# Patient Record
Sex: FEMALE | Race: BLACK OR AFRICAN AMERICAN | Employment: FULL TIME | URBAN - METROPOLITAN AREA
[De-identification: names, ages, dates, MRNs, and addresses within clinical notes are randomized per-mention and may not be internally consistent; named-entity substitution may affect disease eponyms.]

---

## 2019-04-30 ENCOUNTER — OFFICE VISIT (OUTPATIENT)
Dept: NEUROLOGY | Age: 44
End: 2019-04-30

## 2019-04-30 VITALS
WEIGHT: 171 LBS | DIASTOLIC BLOOD PRESSURE: 75 MMHG | HEART RATE: 60 BPM | BODY MASS INDEX: 28.49 KG/M2 | HEIGHT: 65 IN | SYSTOLIC BLOOD PRESSURE: 122 MMHG | OXYGEN SATURATION: 98 %

## 2019-04-30 DIAGNOSIS — G44.309 POST-CONCUSSION HEADACHE: ICD-10-CM

## 2019-04-30 DIAGNOSIS — R93.0 ABNORMAL CT OF THE HEAD: Primary | ICD-10-CM

## 2019-04-30 DIAGNOSIS — G43.009 MIGRAINE WITHOUT AURA AND WITHOUT STATUS MIGRAINOSUS, NOT INTRACTABLE: ICD-10-CM

## 2019-04-30 RX ORDER — TOPIRAMATE 25 MG/1
TABLET ORAL
Qty: 120 TAB | Refills: 2 | Status: SHIPPED | OUTPATIENT
Start: 2019-04-30 | End: 2019-06-04

## 2019-04-30 RX ORDER — SUMATRIPTAN 25 MG/1
TABLET, FILM COATED ORAL
Qty: 9 TAB | Refills: 2 | Status: SHIPPED | OUTPATIENT
Start: 2019-04-30 | End: 2021-06-08 | Stop reason: ALTCHOICE

## 2019-04-30 NOTE — PATIENT INSTRUCTIONS

## 2019-04-30 NOTE — LETTER
Cecilia Farfan 38 y.o. female is my office patient. She I am seeing her for migraines. She can have episodic migraines while at work and might have to take a break and go to a quiet and dark place. In addition to that, sometimes she might have to leave early because of her migraines. Call with questions.  
 
 
Dana Shipley MD 
Neurologist
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
## 2019-04-30 NOTE — LETTER
Onofre Tena 38 y.o. female is my office patient and I am seeing her for migraines. I do recommend that if she does have an attack of migraine while at work, she takes a break and goes to a relatively dark and quiet place. Call with questions.  
 
 
Harini Xiao MD 
Neurologist

## 2019-05-08 ENCOUNTER — DOCUMENTATION ONLY (OUTPATIENT)
Dept: NEUROLOGY | Age: 44
End: 2019-05-08

## 2019-05-16 ENCOUNTER — HOSPITAL ENCOUNTER (OUTPATIENT)
Dept: MRI IMAGING | Age: 44
Discharge: HOME | End: 2019-05-16
Attending: PSYCHIATRY & NEUROLOGY
Payer: COMMERCIAL

## 2019-05-16 DIAGNOSIS — R93.0 ABNORMAL CT OF THE HEAD: ICD-10-CM

## 2019-05-16 PROCEDURE — 70551 MRI BRAIN STEM W/O DYE: CPT

## 2019-05-23 ENCOUNTER — TELEPHONE (OUTPATIENT)
Dept: NEUROLOGY | Age: 44
End: 2019-05-23

## 2019-06-04 DIAGNOSIS — G43.009 MIGRAINE WITHOUT AURA AND WITHOUT STATUS MIGRAINOSUS, NOT INTRACTABLE: ICD-10-CM

## 2019-06-04 RX ORDER — TOPIRAMATE 25 MG/1
TABLET ORAL
Qty: 240 TAB | Refills: 1 | Status: SHIPPED | OUTPATIENT
Start: 2019-06-04 | End: 2020-01-17 | Stop reason: ALTCHOICE

## 2019-07-01 ENCOUNTER — OFFICE VISIT (OUTPATIENT)
Dept: NEUROLOGY | Age: 44
End: 2019-07-01

## 2019-07-01 VITALS
SYSTOLIC BLOOD PRESSURE: 155 MMHG | BODY MASS INDEX: 28.82 KG/M2 | HEART RATE: 78 BPM | HEIGHT: 65 IN | OXYGEN SATURATION: 98 % | WEIGHT: 173 LBS | DIASTOLIC BLOOD PRESSURE: 75 MMHG

## 2019-07-01 DIAGNOSIS — G43.009 MIGRAINE WITHOUT AURA AND WITHOUT STATUS MIGRAINOSUS, NOT INTRACTABLE: Primary | ICD-10-CM

## 2019-07-01 DIAGNOSIS — G44.309 POST-CONCUSSION HEADACHE: ICD-10-CM

## 2019-07-01 RX ORDER — ZOLMITRIPTAN 5 MG/1
1 SPRAY NASAL
Qty: 1 CONTAINER | Refills: 0 | Status: SHIPPED | COMMUNITY
Start: 2019-07-01 | End: 2019-07-01

## 2019-07-01 RX ORDER — METHYLPREDNISOLONE 4 MG/1
TABLET ORAL
Qty: 1 DOSE PACK | Refills: 0 | Status: SHIPPED | OUTPATIENT
Start: 2019-07-01 | End: 2019-10-07 | Stop reason: ALTCHOICE

## 2019-07-01 NOTE — Clinical Note
7/1/19 Patient: Chelsey Lane YOB: 1975 Date of Visit: 7/1/2019 Jim Chaudhry MD 
Patient Can Only Remember The Practice Name And Not The Physician 
VIA Dear Hardik Chaudhry MD, Thank you for referring Ms. Rosemarie Marcum to [address] [place] [address] for evaluation. My notes for this consultation are attached. If you have questions, please do not hesitate to call me. I look forward to following your patient along with you. Sincerely, Asya Solano MD

## 2019-07-01 NOTE — PATIENT INSTRUCTIONS
Office Policies  · Phone calls/patient messages:  Please allow up to 24 hours for someone in the office to contact you about your call or message. Be mindful your provider may be out of the office or your message may require further review. We encourage you to use [employer] for your messages as this is a faster, more efficient way to communicate with our office  · Medication Refills:  Prescription medications require up to 48 business hours to process. We encourage you to use [employer] for your refills. For controlled medications: Please allow up to 72 business hours to process. Certain medications may require you to  a written prescription at our office. NO narcotic/controlled medications will be prescribed after 4pm Monday through Friday or on weekends  · Form/Paperwork Completion:  Please note there is a $25 fee for all paperwork completed by our providers. We ask that you allow 7-14 business days. Pre-payment is due prior to picking up/faxing the completed form. You may also download your forms to [employer] to have your doctor print off.

## 2019-08-13 ENCOUNTER — TELEPHONE (OUTPATIENT)
Dept: NEUROLOGY | Age: 44
End: 2019-08-13

## 2019-08-14 ENCOUNTER — TELEPHONE (OUTPATIENT)
Dept: NEUROLOGY | Age: 44
End: 2019-08-14

## 2019-08-29 ENCOUNTER — TELEPHONE (OUTPATIENT)
Dept: NEUROLOGY | Age: 44
End: 2019-08-29

## 2019-08-29 NOTE — TELEPHONE ENCOUNTER
Re: Emgality PA request  Sent to [facility], included 7/1/19,4/30/19 office notes.   Grant Long (Key: [id number])   Rx #: [id number]   Emgality 120MG/ML auto-injectors (migraine)   Form  [facility] Electronic PA Form (NCPDP)

## 2019-09-10 ENCOUNTER — TELEPHONE (OUTPATIENT)
Dept: NEUROLOGY | Age: 44
End: 2019-09-10

## 2019-09-16 ENCOUNTER — TELEPHONE (OUTPATIENT)
Dept: NEUROLOGY | Age: 44
End: 2019-09-16

## 2019-09-30 ENCOUNTER — TELEPHONE (OUTPATIENT)
Dept: NEUROLOGY | Age: 44
End: 2019-09-30

## 2019-09-30 NOTE — TELEPHONE ENCOUNTER
Spoke to patient back line is having dizziness getting out of the shower in the morning. Discussed with patient to take sumatriptan Rx for abortive therapy if does not help with her pain patient should go to ER to be evaluated and treated.       Dr Jackson Juárez patients pregnancy test was negative  can proceed with PA for [facility]

## 2019-10-01 DIAGNOSIS — G43.009 MIGRAINE WITHOUT AURA AND WITHOUT STATUS MIGRAINOSUS, NOT INTRACTABLE: Primary | ICD-10-CM

## 2019-10-01 RX ORDER — PROPRANOLOL HYDROCHLORIDE 80 MG/1
80 CAPSULE, EXTENDED RELEASE ORAL DAILY
Qty: 30 CAP | Refills: 0 | Status: SHIPPED | OUTPATIENT
Start: 2019-10-01 | End: 2019-12-27 | Stop reason: SDUPTHER

## 2019-10-01 NOTE — TELEPHONE ENCOUNTER
I am adding propranolol 80 mg daily for migraine prevention. According to [facility] denial, 2 medications need to be tried before they will approve it. Patient is already tried Topamax. But now adding propranolol and will give it a month and if there is no improvement in headaches, will prescribe Emgality.

## 2019-10-04 ENCOUNTER — TELEPHONE (OUTPATIENT)
Dept: NEUROLOGY | Age: 44
End: 2019-10-04

## 2019-10-07 ENCOUNTER — OFFICE VISIT (OUTPATIENT)
Dept: NEUROLOGY | Age: 44
End: 2019-10-07

## 2019-10-07 DIAGNOSIS — G43.009 MIGRAINE WITHOUT AURA AND WITHOUT STATUS MIGRAINOSUS, NOT INTRACTABLE: Primary | ICD-10-CM

## 2019-10-07 DIAGNOSIS — G44.309 POST-CONCUSSION HEADACHE: ICD-10-CM

## 2019-10-17 ENCOUNTER — TELEPHONE (OUTPATIENT)
Dept: NEUROLOGY | Age: 44
End: 2019-10-17

## 2019-11-07 ENCOUNTER — HOSPITAL ENCOUNTER (OUTPATIENT)
Dept: MRI IMAGING | Age: 44
Discharge: HOME | End: 2019-11-07
Attending: PSYCHIATRY & NEUROLOGY
Payer: COMMERCIAL

## 2019-11-07 DIAGNOSIS — G43.009 MIGRAINE WITHOUT AURA AND WITHOUT STATUS MIGRAINOSUS, NOT INTRACTABLE: ICD-10-CM

## 2019-11-07 PROCEDURE — 74011250636 HC RX REV CODE- 250/636: Performed by: PSYCHIATRY & NEUROLOGY

## 2019-11-07 PROCEDURE — 70553 MRI BRAIN STEM W/O & W/DYE: CPT

## 2019-11-07 PROCEDURE — A9575 INJ GADOTERATE MEGLUMI 0.1ML: HCPCS | Performed by: PSYCHIATRY & NEUROLOGY

## 2019-11-07 RX ORDER — GADOTERATE MEGLUMINE 376.9 MG/ML
14 INJECTION INTRAVENOUS
Status: COMPLETED | OUTPATIENT
Start: 2019-11-07 | End: 2019-11-07

## 2019-11-07 RX ADMIN — GADOTERATE MEGLUMINE 14 ML: 376.9 INJECTION INTRAVENOUS at 11:59

## 2019-12-27 DIAGNOSIS — G43.009 MIGRAINE WITHOUT AURA AND WITHOUT STATUS MIGRAINOSUS, NOT INTRACTABLE: ICD-10-CM

## 2019-12-27 NOTE — TELEPHONE ENCOUNTER
Received refill request from [facility] for Propranolol. Last filled 10/7/19. Last visit 10/7/19 with Dr John Nava . Next appt scheduled for 01/20/20. Please advise.

## 2019-12-30 RX ORDER — PROPRANOLOL HYDROCHLORIDE 80 MG/1
80 CAPSULE, EXTENDED RELEASE ORAL DAILY
Qty: 30 CAP | Refills: 0 | Status: SHIPPED | OUTPATIENT
Start: 2019-12-30 | End: 2020-01-17 | Stop reason: SDUPTHER

## 2020-01-13 ENCOUNTER — TELEPHONE (OUTPATIENT)
Dept: NEUROLOGY | Age: 45
End: 2020-01-13

## 2020-01-17 DIAGNOSIS — G43.009 MIGRAINE WITHOUT AURA AND WITHOUT STATUS MIGRAINOSUS, NOT INTRACTABLE: ICD-10-CM

## 2020-01-17 DIAGNOSIS — R90.89 ABNORMAL FINDING ON MRI OF BRAIN: Primary | ICD-10-CM

## 2020-01-17 RX ORDER — PROPRANOLOL HYDROCHLORIDE 80 MG/1
80 CAPSULE, EXTENDED RELEASE ORAL DAILY
Qty: 30 CAP | Refills: 0 | Status: SHIPPED | OUTPATIENT
Start: 2020-01-17 | End: 2020-02-12 | Stop reason: SDUPTHER

## 2020-02-03 ENCOUNTER — OFFICE VISIT (OUTPATIENT)
Dept: NEUROLOGY | Age: 45
End: 2020-02-03

## 2020-02-03 VITALS
BODY MASS INDEX: 28.82 KG/M2 | SYSTOLIC BLOOD PRESSURE: 140 MMHG | HEIGHT: 65 IN | HEART RATE: 65 BPM | DIASTOLIC BLOOD PRESSURE: 70 MMHG | WEIGHT: 173 LBS | OXYGEN SATURATION: 98 %

## 2020-02-03 DIAGNOSIS — R06.83 SNORING: ICD-10-CM

## 2020-02-03 DIAGNOSIS — G43.009 MIGRAINE WITHOUT AURA AND WITHOUT STATUS MIGRAINOSUS, NOT INTRACTABLE: Primary | ICD-10-CM

## 2020-02-03 DIAGNOSIS — G44.309 POST-CONCUSSION HEADACHE: ICD-10-CM

## 2020-02-03 NOTE — PATIENT INSTRUCTIONS
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
## 2020-02-12 DIAGNOSIS — G43.009 MIGRAINE WITHOUT AURA AND WITHOUT STATUS MIGRAINOSUS, NOT INTRACTABLE: ICD-10-CM

## 2020-02-12 NOTE — TELEPHONE ENCOUNTER
Future Appointments   Date Time Provider Sangeeta Denise   6/4/2020  2:00 PM MD Emily Gordon                         Last Appointment My Department:  2/3/2020    Please advise of refill below. Requested Prescriptions     Pending Prescriptions Disp Refills    propranolol LA (INDERAL LA) 80 mg SR capsule 30 Cap 0     Sig: Take 1 Cap by mouth daily.

## 2020-02-18 RX ORDER — PROPRANOLOL HYDROCHLORIDE 80 MG/1
80 CAPSULE, EXTENDED RELEASE ORAL DAILY
Qty: 30 CAP | Refills: 11 | Status: SHIPPED | OUTPATIENT
Start: 2020-02-18 | End: 2021-09-14

## 2020-03-19 ENCOUNTER — TELEPHONE (OUTPATIENT)
Dept: NEUROLOGY | Age: 45
End: 2020-03-19

## 2020-03-19 NOTE — TELEPHONE ENCOUNTER
Emgality PA request sent via [facility] to [facility]. Aron Ornelas (Lo: [id number])  Rx #: [id number]  Emgality 120MG/ML auto-injectors (migraine)     Form  [facility] Electronic PA Form (NCPDP)     Status is pending.

## 2020-04-14 ENCOUNTER — TELEPHONE (OUTPATIENT)
Dept: NEUROLOGY | Age: 45
End: 2020-04-14

## 2020-05-28 ENCOUNTER — DOCUMENTATION ONLY (OUTPATIENT)
Dept: SLEEP MEDICINE | Age: 45
End: 2020-05-28
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
## 2021-06-08 ENCOUNTER — VIRTUAL VISIT (OUTPATIENT)
Dept: NEUROLOGY | Age: 46
End: 2021-06-08
Payer: COMMERCIAL

## 2021-06-08 DIAGNOSIS — G43.009 MIGRAINE WITHOUT AURA AND WITHOUT STATUS MIGRAINOSUS, NOT INTRACTABLE: Primary | ICD-10-CM

## 2021-06-08 DIAGNOSIS — R90.89 ABNORMAL FINDING ON MRI OF BRAIN: ICD-10-CM

## 2021-06-08 DIAGNOSIS — G44.309 POST-CONCUSSION HEADACHE: ICD-10-CM

## 2021-06-08 PROCEDURE — 99214 OFFICE O/P EST MOD 30 MIN: CPT | Performed by: PSYCHIATRY & NEUROLOGY

## 2021-06-08 RX ORDER — GALCANEZUMAB 120 MG/ML
240 INJECTION, SOLUTION SUBCUTANEOUS ONCE
Qty: 2 ML | Refills: 0 | Status: SHIPPED | OUTPATIENT
Start: 2021-06-08 | End: 2021-06-08

## 2021-06-08 RX ORDER — GALCANEZUMAB 120 MG/ML
120 INJECTION, SOLUTION SUBCUTANEOUS
Qty: 1 ML | Refills: 11 | Status: SHIPPED | OUTPATIENT
Start: 2021-06-08 | End: 2021-09-14

## 2021-06-08 NOTE — PROGRESS NOTES
Chief Complaint   Patient presents with    Follow-up     doing ok, still has her days     Migraine     [employer] was never approved, needed PA but it was never received
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
## 2021-09-14 ENCOUNTER — VIRTUAL VISIT (OUTPATIENT)
Dept: NEUROLOGY | Age: 46
End: 2021-09-14
Payer: COMMERCIAL

## 2021-09-14 DIAGNOSIS — G43.009 MIGRAINE WITHOUT AURA AND WITHOUT STATUS MIGRAINOSUS, NOT INTRACTABLE: Primary | ICD-10-CM

## 2021-09-14 DIAGNOSIS — R90.89 ABNORMAL FINDING ON MRI OF BRAIN: ICD-10-CM

## 2021-09-14 DIAGNOSIS — G44.309 POST-CONCUSSION HEADACHE: ICD-10-CM

## 2021-09-14 PROBLEM — F45.22 BODY DYSMORPHIC DISORDER: Status: ACTIVE | Noted: 2021-09-14

## 2021-09-14 PROBLEM — L02.91 ABSCESS OF SKIN AND SUBCUTANEOUS TISSUE: Status: ACTIVE | Noted: 2021-09-14

## 2021-09-14 PROBLEM — F32.2 SEVERE MAJOR DEPRESSION, SINGLE EPISODE, WITHOUT PSYCHOTIC FEATURES (HCC): Status: ACTIVE | Noted: 2021-09-14

## 2021-09-14 PROBLEM — D50.9 IRON DEFICIENCY ANEMIA: Status: ACTIVE | Noted: 2021-09-14

## 2021-09-14 PROBLEM — E55.9 VITAMIN D DEFICIENCY: Status: ACTIVE | Noted: 2021-09-14

## 2021-09-14 PROBLEM — R42 DIZZINESS: Status: ACTIVE | Noted: 2021-09-14

## 2021-09-14 PROBLEM — F41.1 GENERALIZED ANXIETY DISORDER: Status: ACTIVE | Noted: 2021-09-14

## 2021-09-14 PROBLEM — Z12.39 ENCOUNTER FOR SCREENING FOR MALIGNANT NEOPLASM OF BREAST: Status: ACTIVE | Noted: 2021-09-14

## 2021-09-14 PROBLEM — J06.9 VIRAL UPPER RESPIRATORY TRACT INFECTION: Status: ACTIVE | Noted: 2021-09-14

## 2021-09-14 PROCEDURE — 99215 OFFICE O/P EST HI 40 MIN: CPT | Performed by: PSYCHIATRY & NEUROLOGY

## 2021-09-14 RX ORDER — IBUPROFEN 800 MG/1
800 TABLET ORAL
COMMUNITY
Start: 2021-09-08 | End: 2022-03-23

## 2021-09-14 RX ORDER — BUPROPION HYDROCHLORIDE 150 MG/1
150 TABLET ORAL DAILY
COMMUNITY
Start: 2021-06-07 | End: 2021-09-14

## 2021-09-14 RX ORDER — PROPRANOLOL HYDROCHLORIDE 10 MG/1
TABLET ORAL
COMMUNITY
Start: 2021-06-07 | End: 2021-09-14

## 2021-09-14 RX ORDER — BUPROPION HYDROCHLORIDE 100 MG/1
TABLET, EXTENDED RELEASE ORAL
COMMUNITY
End: 2021-09-14

## 2021-09-14 RX ORDER — FLUOXETINE HYDROCHLORIDE 20 MG/1
CAPSULE ORAL
COMMUNITY
End: 2021-09-14

## 2021-09-14 RX ORDER — AMOXICILLIN 500 MG/1
CAPSULE ORAL
COMMUNITY
Start: 2021-09-08 | End: 2022-03-23

## 2021-09-14 RX ORDER — LORAZEPAM 0.5 MG/1
TABLET ORAL
COMMUNITY
End: 2021-09-14

## 2021-09-14 RX ORDER — SODIUM FLUORIDE 1.1 G/100G
GEL, DENTIFRICE ORAL
COMMUNITY
Start: 2021-08-10 | End: 2021-09-14

## 2021-09-14 NOTE — ASSESSMENT & PLAN NOTE
Patient has an active order for repeat MRI scan of the brain  I do think it would probably be important for her to get this completed but for now she would prefer to defer
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
## 2021-09-14 NOTE — PATIENT INSTRUCTIONS
As per discussion    I agree you are doing well  Right now I do not think you need to be on any preventative medication  As long as you can take 1 or 2 doses of Tylenol no more than 2 days/week to manage her headaches and migraines I think that is all you need to do  If however you are starting to escalate more consistently in terms of the number of days per week you are getting headaches more than normal doses you need to totally abort the headache then you probably need to get in to see me sooner so we can make some adjustments    Regarding mood and depression  Continue with your counselor  Your counselor will advise you if you need to go back on medication and see mental health psychiatry for med management  Allow some information from the [facility] about seasonal affective disorder and choosing a light therapy which might be beneficial    [email or web address]       If you need me before your next office appointment my chart is most efficient effective way to communicate with me but can always call the office    Please get your booster shot for Covid when available you can check with your pharmacist, and thank you for getting your initial Covid vaccination completed    Finally MRI scan was ordered for you back in June I do think it would be important to relook at your MRI scan to make sure there is been no change or progression. The order is still active  You can call central scheduling at [phone] to get that set up. If there there are any changes we will notify you by CADsurft and discuss any further intervention at that time          Office Policies    o Phone calls/patient messages:  Please allow up to 24 hours for someone in the office to contact you about your call or message. Be mindful your provider may be out of the office or your message may require further review.  We encourage you to use [employer] for your messages as this is a faster, more efficient way to communicate with our office    o Medication Refills:  Prescription medications require up to 48 business hours to process. We encourage you to use [employer] for your refills. For controlled medications: Please allow up to 72 business hours to process. Certain medications may require you to  a written prescription at our office. NO narcotic/controlled medications will be prescribed after 4pm Monday through Friday or on weekends    o Form/Paperwork Completion:  We ask that you allow 7-14 business days. You may also download your forms to [employer] to have your doctor print off.

## 2021-09-14 NOTE — ASSESSMENT & PLAN NOTE
Seemingly improved and stable without preventative medication  Using over-the-counter abortive medication only  As long she is not using rescue medication more than 2 days a week and more than 2 doses per day to fully abort the headache we do not need any further intervention at this time

## 2021-10-14 PROBLEM — Z12.39 ENCOUNTER FOR SCREENING FOR MALIGNANT NEOPLASM OF BREAST: Status: RESOLVED | Noted: 2021-09-14 | Resolved: 2021-10-14

## 2022-03-18 PROBLEM — F45.22 BODY DYSMORPHIC DISORDER: Status: ACTIVE | Noted: 2021-09-14

## 2022-03-18 PROBLEM — G43.009 MIGRAINE WITHOUT AURA AND WITHOUT STATUS MIGRAINOSUS, NOT INTRACTABLE: Status: ACTIVE | Noted: 2021-09-14

## 2022-03-18 PROBLEM — R42 DIZZINESS: Status: ACTIVE | Noted: 2021-09-14

## 2022-03-18 PROBLEM — F32.2 SEVERE MAJOR DEPRESSION, SINGLE EPISODE, WITHOUT PSYCHOTIC FEATURES (HCC): Status: ACTIVE | Noted: 2021-09-14

## 2022-03-19 PROBLEM — F41.1 GENERALIZED ANXIETY DISORDER: Status: ACTIVE | Noted: 2021-09-14

## 2022-03-19 PROBLEM — D50.9 IRON DEFICIENCY ANEMIA: Status: ACTIVE | Noted: 2021-09-14

## 2022-03-19 PROBLEM — R90.89 ABNORMAL FINDING ON MRI OF BRAIN: Status: ACTIVE | Noted: 2021-09-14

## 2022-03-19 PROBLEM — J06.9 VIRAL UPPER RESPIRATORY TRACT INFECTION: Status: ACTIVE | Noted: 2021-09-14

## 2022-03-19 PROBLEM — E55.9 VITAMIN D DEFICIENCY: Status: ACTIVE | Noted: 2021-09-14

## 2022-03-20 PROBLEM — G44.309 POST-CONCUSSION HEADACHE: Status: ACTIVE | Noted: 2021-09-14

## 2022-03-20 PROBLEM — L02.91 ABSCESS OF SKIN AND SUBCUTANEOUS TISSUE: Status: ACTIVE | Noted: 2021-09-14

## 2022-03-23 ENCOUNTER — VIRTUAL VISIT (OUTPATIENT)
Dept: NEUROLOGY | Age: 47
End: 2022-03-23
Payer: COMMERCIAL

## 2022-03-23 DIAGNOSIS — R93.0 ABNORMAL MRI OF HEAD: Primary | ICD-10-CM

## 2022-03-23 DIAGNOSIS — G43.709 CHRONIC MIGRAINE WITHOUT AURA WITHOUT STATUS MIGRAINOSUS, NOT INTRACTABLE: ICD-10-CM

## 2022-03-23 DIAGNOSIS — Z86.73 HISTORY OF STROKE: ICD-10-CM

## 2022-03-23 PROBLEM — K21.9 GASTROESOPHAGEAL REFLUX DISEASE: Status: ACTIVE | Noted: 2022-03-23

## 2022-03-23 PROCEDURE — 99215 OFFICE O/P EST HI 40 MIN: CPT | Performed by: PSYCHIATRY & NEUROLOGY

## 2022-03-23 RX ORDER — TOPIRAMATE 50 MG/1
50 TABLET, FILM COATED ORAL DAILY
Qty: 90 TABLET | Refills: 1 | Status: SHIPPED | OUTPATIENT
Start: 2022-03-23

## 2022-03-23 RX ORDER — LANOLIN ALCOHOL/MO/W.PET/CERES
CREAM (GRAM) TOPICAL
COMMUNITY
End: 2022-09-28

## 2022-03-23 RX ORDER — RIZATRIPTAN BENZOATE 10 MG/1
TABLET ORAL
Qty: 9 TABLET | Refills: 5 | Status: SHIPPED | OUTPATIENT
Start: 2022-03-23

## 2022-03-23 NOTE — PATIENT INSTRUCTIONS
As per discussion    I sent in a prescription for Topamax to help down regulate the migraine generator I want you to take 1 a day most people take it in the evening but it does not really matter as long as you are consistent with when to take it    You need more aggressive rescue medication some to start you on Maxalt also known as rizatriptan directions are on the bottle do not exceed more than 3 tablets in 24-hour. As we talked about you can use the Tylenol as well but try to figure out the severity and whether it would respond better to the Maxalt or the Tylenol stratify your utilization of your rescue medications based on the severity of the headache    Let me know how you are doing and next via [facility]    MRI has been ordered  Central scheduling should be calling you to set up the test that have been ordered. If you do not hear from them you can reach out to them at [phone] to get that completed. I reviewed the results of the MRI scan with my chart and let you know if we need to do anything differently based on those results        Office Policies    o Phone calls/patient messages:  Please allow up to 24 hours for someone in the office to contact you about your call or message. Be mindful your provider may be out of the office or your message may require further review. We encourage you to use [employer] for your messages as this is a faster, more efficient way to communicate with our office    o Medication Refills:  Prescription medications require up to 48 business hours to process. We encourage you to use [employer] for your refills. For controlled medications: Please allow up to 72 business hours to process. Certain medications may require you to  a written prescription at our office. NO narcotic/controlled medications will be prescribed after 4pm Monday through Friday or on weekends    o Form/Paperwork Completion:  We ask that you allow 7-14 business days.  You may also download your forms to MyChart to have your doctor print off.
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
## 2022-03-23 NOTE — ASSESSMENT & PLAN NOTE
Patient with a history of abnormal MRI scan of the brain  Secondary to COVID 19 this was never completed patient is willing to get MRI scan completed presently  Orders have been written for the new scan  Evaluating for progression of abnormalities   Evaluating for possible demyelinating disease  Secondarily patient is also having a change in headache and migraine and although this is not the primary reason why I am redoing the MRI scan we will certainly be able to see if there is anything significant that is triggering this

## 2022-03-23 NOTE — ASSESSMENT & PLAN NOTE
Patient with change in migraine she is having the morphine with more intensity    We will start Topamax 50 mg daily as preventative to help down regulate the migraine generator    She also needs better rescue protocol and I have prescribed Maxalt  She can continue to use the Tylenol  I recommended stratified protocol related to severity of migraine

## 2022-03-24 PROBLEM — G43.709 CHRONIC MIGRAINE WITHOUT AURA WITHOUT STATUS MIGRAINOSUS, NOT INTRACTABLE: Status: ACTIVE | Noted: 2021-09-14

## 2022-03-24 PROBLEM — K21.9 GASTROESOPHAGEAL REFLUX DISEASE: Status: ACTIVE | Noted: 2022-03-23

## 2022-03-24 PROBLEM — R93.0 ABNORMAL MRI OF HEAD: Status: ACTIVE | Noted: 2021-09-14

## 2022-04-17 ENCOUNTER — HOSPITAL ENCOUNTER (OUTPATIENT)
Dept: MRI IMAGING | Age: 47
Discharge: HOME | End: 2022-04-17
Payer: COMMERCIAL

## 2022-04-17 DIAGNOSIS — R93.0 ABNORMAL MRI OF HEAD: ICD-10-CM

## 2022-04-17 PROCEDURE — 70553 MRI BRAIN STEM W/O & W/DYE: CPT

## 2022-04-17 PROCEDURE — 74011250636 HC RX REV CODE- 250/636

## 2022-04-17 PROCEDURE — A9576 INJ PROHANCE MULTIPACK: HCPCS

## 2022-04-17 RX ADMIN — GADOTERIDOL 15 ML: 279.3 INJECTION, SOLUTION INTRAVENOUS at 15:00

## 2022-04-20 NOTE — PROGRESS NOTES
Results of the tests were reviewed in MyChart with the patient as follows:Good news! Your MRI of your brain has not changed since your prior scan in 2019 so that is awesome. But it does appear as though on both scans you have evidence of 2 old strokes.   If you are not already taking a baby aspirin a day I recommend taking 81 mg aspirin daily  In addition I like to order additional work-up regarding the findings on your MRI scan

## 2022-05-03 ENCOUNTER — TELEPHONE (OUTPATIENT)
Dept: NEUROLOGY | Age: 47
End: 2022-05-03

## 2022-05-03 NOTE — TELEPHONE ENCOUNTER
Pt would like a call back to go over MRI results. Pt is not sure if she can get in to [employer] to read message calvin irvin.  Please call [phone]

## 2022-05-04 NOTE — TELEPHONE ENCOUNTER
Confirmed patient id and advised of the results of the MRI and of the additional orders that Audra Ochoa has ordered. cta head neck scan and ECHO adult. Gave number to call to schedule the test.      Patient states that she is having new symptoms. She says she knows what she wants to say but it comes out different. Sometimes speech is slurred. no nose bleeding/no gum bleeding

## 2022-05-05 NOTE — TELEPHONE ENCOUNTER
Let the patient know that is why we are doing the additional testing.   Make sure she is on a baby aspirin a day and if she has any prolonged event she needs to go to the emergency room for evaluation

## 2022-05-05 NOTE — TELEPHONE ENCOUNTER
Confirmed patient id and advised to take baby aspirin and do the testing. She has scheduled for Friday the testing.   Advised tthat if she has any prolonged events to please go to the ER

## 2022-05-13 ENCOUNTER — HOSPITAL ENCOUNTER (OUTPATIENT)
Dept: NON INVASIVE DIAGNOSTICS | Age: 47
Discharge: HOME | End: 2022-05-13
Payer: COMMERCIAL

## 2022-05-13 ENCOUNTER — HOSPITAL ENCOUNTER (OUTPATIENT)
Dept: CT IMAGING | Age: 47
Discharge: HOME | End: 2022-05-13
Payer: COMMERCIAL

## 2022-05-13 DIAGNOSIS — Z86.73 HISTORY OF STROKE: ICD-10-CM

## 2022-05-13 DIAGNOSIS — R93.0 ABNORMAL MRI OF HEAD: ICD-10-CM

## 2022-05-13 PROCEDURE — 70498 CT ANGIOGRAPHY NECK: CPT

## 2022-05-13 PROCEDURE — 74011000636 HC RX REV CODE- 636: Performed by: RADIOLOGY

## 2022-05-13 PROCEDURE — 93306 TTE W/DOPPLER COMPLETE: CPT

## 2022-05-13 RX ADMIN — IOPAMIDOL 100 ML: 755 INJECTION, SOLUTION INTRAVENOUS at 14:24

## 2022-05-14 LAB
ECHO AV AREA PEAK VELOCITY: 2.1 CM2
ECHO AV PEAK GRADIENT: 7 MMHG
ECHO AV PEAK VELOCITY: 1.3 M/S
ECHO AV VELOCITY RATIO: 0.85
ECHO EST RA PRESSURE: 3 MMHG
ECHO LV FRACTIONAL SHORTENING: 36 % (ref 28–44)
ECHO LV INTERNAL DIMENSION DIASTOLIC MMODE: 5.3 CM (ref 3.9–5.3)
ECHO LV INTERNAL DIMENSION DIASTOLIC: 4.2 CM (ref 3.9–5.3)
ECHO LV INTERNAL DIMENSION SYSTOLIC MMODE: 3.5 CM
ECHO LV INTERNAL DIMENSION SYSTOLIC: 2.7 CM
ECHO LV IVSD MMODE: 0.7 CM (ref 0.6–0.9)
ECHO LV IVSS MMODE: 1.3 CM
ECHO LV POSTERIOR WALL DIASTOLIC MMODE: 1 CM (ref 0.6–0.9)
ECHO LV POSTERIOR WALL SYSTOLIC MMODE: 1.3 CM
ECHO LVOT AREA: 2.5 CM2
ECHO LVOT DIAM: 1.8 CM
ECHO LVOT PEAK GRADIENT: 4 MMHG
ECHO LVOT PEAK VELOCITY: 1.1 M/S
ECHO MV A VELOCITY: 0.92 M/S
ECHO RIGHT VENTRICULAR SYSTOLIC PRESSURE (RVSP): 18 MMHG
ECHO RV TAPSE: 3.1 CM (ref 1.7–?)
ECHO TV REGURGITANT MAX VELOCITY: 1.93 M/S
ECHO TV REGURGITANT PEAK GRADIENT: 15 MMHG

## 2022-05-14 PROCEDURE — 93306 TTE W/DOPPLER COMPLETE: CPT | Performed by: SPECIALIST

## 2022-05-16 NOTE — PROGRESS NOTES
Results of the tests were reviewed in MyChart with the patient as follows: The results of echocardiogram and your CTA of your head neck blood vessels are all normal.  So I have not been able to identify a cause for the 2 strokes that were noted on your MRI scan. However it is picked up an incidental 9 mm nodule on your thyroid. If this has not already been looked at, please contact your PCP for follow-up.   These are often benign and incidental but I would recommend contacting her PCP for further instruction on follow-up if needed    For now continue on an 81 mg enteric-coated aspirin daily

## 2022-05-24 ENCOUNTER — TELEPHONE (OUTPATIENT)
Dept: NEUROLOGY | Age: 47
End: 2022-05-24

## 2022-05-25 NOTE — TELEPHONE ENCOUNTER
Confirmed patient id and advised the patient of the message that was sent to her on 5/16 thru my chart. Read the message and advised patient to contact pcp as a 9mm nodule was found on the thyroid.   Advised to continue with the low dose aspirin
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
## 2022-06-29 ENCOUNTER — VIRTUAL VISIT (OUTPATIENT)
Dept: NEUROLOGY | Age: 47
End: 2022-06-29
Payer: COMMERCIAL

## 2022-06-29 DIAGNOSIS — G43.709 CHRONIC MIGRAINE WITHOUT AURA WITHOUT STATUS MIGRAINOSUS, NOT INTRACTABLE: ICD-10-CM

## 2022-06-29 DIAGNOSIS — I67.9 CEREBRAL VASCULAR DISEASE: ICD-10-CM

## 2022-06-29 DIAGNOSIS — G31.84 MCI (MILD COGNITIVE IMPAIRMENT): ICD-10-CM

## 2022-06-29 DIAGNOSIS — R41.3 MEMORY LOSS: Primary | ICD-10-CM

## 2022-06-29 DIAGNOSIS — R93.0 ABNORMAL MRI OF HEAD: ICD-10-CM

## 2022-06-29 DIAGNOSIS — G44.309 POST-CONCUSSION HEADACHE: ICD-10-CM

## 2022-06-29 PROCEDURE — 99443 PR PHYS/QHP TELEPHONE EVALUATION 21-30 MIN: CPT | Performed by: PSYCHIATRY & NEUROLOGY

## 2022-08-15 ENCOUNTER — TELEPHONE (OUTPATIENT)
Dept: NEUROLOGY | Age: 47
End: 2022-08-15

## 2022-08-15 NOTE — TELEPHONE ENCOUNTER
Pt has been at [facility] since Sat. They think she may have had another stroke. They are running more tests. She did have a speech pathologist come by who thinks she needs SP cognitive testing.  Please call to discuss what has been going on. [phone]

## 2022-08-16 ENCOUNTER — TELEPHONE (OUTPATIENT)
Dept: NEUROLOGY | Age: 47
End: 2022-08-16

## 2022-08-16 NOTE — TELEPHONE ENCOUNTER
Called and spoke with patient. Verified name/. Called to see how she was feeling since D/C. 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 When patient had TIA she noted she was typing on her computer and her left arm became feeling heavy/lazy feeling. She went to [facility] and diagnosed with TIA. I advised patient to get [facility] to fax over hospital records for Dr. Stas White to review. I also have changed patient virtual visit with Esdras Kevin on 2022 to in-person for a hospital follow up. Patient is wondering if she went back to working too fast and what to do. Sent to Dr. Stas White to advise.

## 2022-08-16 NOTE — TELEPHONE ENCOUNTER
Patient returned call to Nurse. Stated she did have a mini stroke, was released from the hospital and is going to attempt to go to work today.  She stated that if she doesn't answer her phone to please leave her a detailed vm. [phone]

## 2022-08-17 ENCOUNTER — TELEPHONE (OUTPATIENT)
Dept: NEUROLOGY | Age: 47
End: 2022-08-17

## 2022-08-22 ENCOUNTER — TELEPHONE (OUTPATIENT)
Dept: NEUROLOGY | Age: 47
End: 2022-08-22

## 2022-08-22 NOTE — TELEPHONE ENCOUNTER
VOICEMAIL    . Pt states that she came in thurs 8/18 to fill out a release of med info form. States that location to release info to us has not received the form. Is asking for us to refax. Please call pt to confim what fax number it was sent to and if we have received confirmation that it went through.  [phone]

## 2022-08-23 NOTE — TELEPHONE ENCOUNTER
Pt called after checking with [facility]. They still have not received the release form. Patient has asked for us to fax to: [phone] Please write Urgent or STAT on the cover sheet.

## 2022-09-07 NOTE — TELEPHONE ENCOUNTER
Pt called to check to see if we have received the records from [facility]. Confirmed that we did receive records. Would like to speak to a nurse about what was found and some of the symptoms that have continued since her stroke. Pt has an appt on 9/28 with Fiona Fleming. Pt wants to know if she can be seen sooner.  Please call [phone]

## 2022-09-08 ENCOUNTER — TELEPHONE (OUTPATIENT)
Dept: NEUROLOGY | Age: 47
End: 2022-09-08

## 2022-09-08 NOTE — TELEPHONE ENCOUNTER
Called and spoke with patient. Verified name/. Patient stated she is having increasing brain fog, difficulty concentrating, issues forming words, and has been more emotional. She also would like to know if Dr Kia Fenton could fill out paperwork for accomodation for her job. Routed to Dr. Kia Fenton to review. Patient also with questions regarding her records from [facility] on what Dr. Kia Fenton saw with them.

## 2022-09-12 NOTE — TELEPHONE ENCOUNTER
Called and spoke with patient. Patient received a work letter from her PCP, she is hoping this will suffice. Patient wants to know regarding what you found on her records you requested from [facility]. Also, she wants to know what do regarding the symptoms she is experiencing. Patient was explaining to me about her work accomodation papers, the phone got disconnected. Called back, she stated she is just worried about being out of a job. Stated if they need a specialist to review for her to drop the paperwork off and he can review and we go from there. {Patient verbalized understanding.

## 2022-09-14 ENCOUNTER — TELEPHONE (OUTPATIENT)
Dept: NEUROLOGY | Age: 47
End: 2022-09-14

## 2022-09-14 NOTE — TELEPHONE ENCOUNTER
Voicemail:    Patient returned call to Nurse to let her know that the accommodating paperwork does need to be be filled out and signed by a specialty provider. Patient stated that it is due on Friday and would like to know if she can be squeezed in to be seen to get the paperwork done. Please advise.  [phone]

## 2022-09-14 NOTE — TELEPHONE ENCOUNTER
Called and spoke with patient. Notified patient that her primary neurologist will need to fill this paperwork out as Dr. Chris Townsend only saw her for that emergency work in appointment. Patient stated her paperwork is due 9/16/2022. Notified patient that [facility] does not have an appointment to be able to get her in to fill that paperwork out. I asked patient to contact her work to see If they can extend the due date for her accomodation paperwork. She will do this and await a call back from [address] [place] [address]

## 2022-09-15 NOTE — TELEPHONE ENCOUNTER
Confirmed patient id and advised that we do have the notes from javon and also Brooklynn Newell wants the patient to please keep appointment 9/28 and to get extension as she is not able to complete without the visit. Since patient last visit with Brooklynn Newell only pertained to migraine.

## 2022-09-19 ENCOUNTER — TELEPHONE (OUTPATIENT)
Dept: NEUROLOGY | Age: 47
End: 2022-09-19

## 2022-09-19 NOTE — TELEPHONE ENCOUNTER
VOICEMAIL    Pt was able to get an extension of her medical accomodation forms. Paperwork needs to be turned in by end of month.  Please call [phone]

## 2022-09-19 NOTE — TELEPHONE ENCOUNTER
LVM for patient that we are glad that she got the extention and to please bring the paperwork with her at her visit on 9/28 and the forms will be completed at the visit.

## 2022-09-28 ENCOUNTER — OFFICE VISIT (OUTPATIENT)
Dept: NEUROLOGY | Age: 47
End: 2022-09-28
Payer: COMMERCIAL

## 2022-09-28 VITALS
TEMPERATURE: 97.6 F | HEART RATE: 69 BPM | RESPIRATION RATE: 16 BRPM | SYSTOLIC BLOOD PRESSURE: 120 MMHG | DIASTOLIC BLOOD PRESSURE: 62 MMHG | BODY MASS INDEX: 27.89 KG/M2 | OXYGEN SATURATION: 100 % | HEIGHT: 65 IN | WEIGHT: 167.4 LBS

## 2022-09-28 DIAGNOSIS — Z86.73 HISTORY OF STROKE: ICD-10-CM

## 2022-09-28 DIAGNOSIS — G45.1 TIA INVOLVING CAROTID ARTERY: ICD-10-CM

## 2022-09-28 DIAGNOSIS — R41.9 COGNITIVE COMPLAINTS: ICD-10-CM

## 2022-09-28 DIAGNOSIS — G43.709 CHRONIC MIGRAINE WITHOUT AURA WITHOUT STATUS MIGRAINOSUS, NOT INTRACTABLE: ICD-10-CM

## 2022-09-28 PROCEDURE — 99215 OFFICE O/P EST HI 40 MIN: CPT | Performed by: PSYCHIATRY & NEUROLOGY

## 2022-09-28 RX ORDER — ASPIRIN 81 MG/1
TABLET ORAL
COMMUNITY

## 2022-09-28 RX ORDER — ATORVASTATIN CALCIUM 20 MG/1
TABLET, FILM COATED ORAL
COMMUNITY
Start: 2022-08-16

## 2022-09-28 NOTE — ASSESSMENT & PLAN NOTE
Stable without recurrence of symptoms  Patient is to continue on 81 mg aspirin a day    Patient is to continue to work to all of his comorbid conditions as managed by PCP and other specialists as appropriate    RECOMMENDATIONS:  - BP goal is less than 140/90  - Goal HbA1c is less than 7.   - LDL less than 70     Paperwork for employer related to [facility] and loss time at work was completed at today's office visit as well
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
## 2022-09-28 NOTE — PROGRESS NOTES
Chief Complaint   Patient presents with    Hospital Follow Up     On TIA and has paperwork for work to be done
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
## 2022-09-28 NOTE — PATIENT INSTRUCTIONS
As per discussion    For now continue on all your medications unchanged    We filled out the paperwork specific to your TIA that you were seen in [facility] on 8/13/2022  Paperwork was filled out for 8/13/2022 through 8/30/2022    Testing for your cognitive concerns have been ordered with Dr. Meg Galvan and that is scheduled for February 3, 8001      Office Policies      Appointments  Please make sure that you arrive for your next appointment at least 15 minutes prior to your appointment time. If for some reason you are going to be late please notify the office to determine if you need to be rescheduled or we can adjust your appointment time      Phone calls/patient messages:  Please allow up to 24 hours for someone in the office to contact you about your call or message. Be mindful your provider may be out of the office or your message may require further review. We encourage you to use [employer] for your messages as this is a faster, more efficient way to communicate with our office    Medication Refills:  Prescription medications require up to 48 business hours to process. We encourage you to use [employer] for your refills. For controlled medications: Please allow up to 72 business hours to process. Certain medications may require you to  a written prescription at our office. NO narcotic/controlled medications will be prescribed after 4pm Monday through Friday or on weekends    Form/Paperwork Completion:  We ask that you allow 7-14 business days. You may also download your forms to [employer] to have your doctor print off.
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
## 2022-10-26 ENCOUNTER — TELEPHONE (OUTPATIENT)
Dept: NEUROLOGY | Age: 47
End: 2022-10-26

## 2022-10-26 NOTE — TELEPHONE ENCOUNTER
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 She ended her call and laid down. When she stood up she was dizzy and lite headed. She noted she has just felt off today and not right. When she went up the stairs she noted sob, some confusion. No headache, no weakness on one side of body verus other. No vitals to report. She notes some similar symptoms to her other TIA. Pt has only had almonds to eat today nothing to drink. I advised this could just be dehydration due to nothing to drink. I advised pt she should call 911- not drive herself- get to ER to be evaluated. Advised multiple times to go to ER. Pt verbalized understanding.

## 2022-10-26 NOTE — TELEPHONE ENCOUNTER
VOICEMAIL    Pt thinks she might be having a stroke. Symptoms came on quickly. Dizzy having trouble speaking. Please call ASAP.  [phone]

## 2022-10-26 NOTE — TELEPHONE ENCOUNTER
Pt dianeed asking to speak to [facility] again. She is in the ER being admitted. I made contact with Chaya, who was with another pt. Returned to the line pt was on. Pt acknowledged I had returned to the call. I explained that [facility] was with another pt and asked if she could call her back. Pt disconnected the call.

## 2022-10-27 NOTE — TELEPHONE ENCOUNTER
Message  Received: Today  Vashti Patino NP sent to Jignesh Mendoza LPN  Caller: Unspecified (Yesterday, 12:00 PM)  Please call patient tomorrow and see how she is feeling I just checked epic no evidence of her going to the ED at this time of this note           Pt returned call when she was at the ER and she advised she was being admitted. I could not take the call at the time-I was on the phone with another pt. I do not see any documentation in care everywhere either. I will call pt on Monday-as I am off tomorrow.

## 2022-10-31 NOTE — TELEPHONE ENCOUNTER
Returned call,verified pt with two pt identifiers, advised pt I was calling to check and see if she went to the ER. Pt confirmed she did go to ER at [facility] and they kept her for 1 day. They told her she did have another TIA. Pt is still feeling sluggish, very fatigued. I advised I will let Kassandra Devries know and see if we need to get her in the office sooner than her 3/30/23. Advised I will call her tomorrow. Pt verbalized understanding.

## 2022-11-01 NOTE — TELEPHONE ENCOUNTER
Received records from [facility]. Will give to Anatoly Arroyo for review.

## 2022-11-01 NOTE — TELEPHONE ENCOUNTER
Message  Received: Vanesa Moore NP sent to Casey Abarca LPN  Caller: Unspecified (6 days ago, 12:00 PM)  I need to get records from the ED and hospitalization for review     In addition see if you can work her in somewhere over the next several weeks I usually try to work people when an [phone] appointment on a Monday Tuesday or Thursday if you have to double book

## 2022-11-01 NOTE — TELEPHONE ENCOUNTER
Faxed request for ED visit to [facility] at [phone] for records on hospital stay from 10/26/22- 10/27/22 faxed to my attn. Received fax confirmation. Called pt and left voice message to call me back in regards to scheduling an appt.

## 2022-11-02 NOTE — TELEPHONE ENCOUNTER
Called pt,verified pt with two pt identifiers, advised pt that Rody Han would like to see her in the office after the ED visit. Made appt with pt on the phone for 11/21/22 at 11:30 am with Rody Han. Advised I will put reminder in mail to pt. Pt verbalized understanding. Put reminder in the mail to pt.

## 2022-11-21 ENCOUNTER — OFFICE VISIT (OUTPATIENT)
Dept: NEUROLOGY | Age: 47
End: 2022-11-21
Payer: COMMERCIAL

## 2022-11-21 VITALS
HEART RATE: 111 BPM | OXYGEN SATURATION: 100 % | BODY MASS INDEX: 27.49 KG/M2 | WEIGHT: 165 LBS | RESPIRATION RATE: 17 BRPM | DIASTOLIC BLOOD PRESSURE: 68 MMHG | HEIGHT: 65 IN | TEMPERATURE: 98.1 F | SYSTOLIC BLOOD PRESSURE: 124 MMHG

## 2022-11-21 DIAGNOSIS — Z86.73 HISTORY OF STROKE: Primary | ICD-10-CM

## 2022-11-21 DIAGNOSIS — G43.709 CHRONIC MIGRAINE WITHOUT AURA WITHOUT STATUS MIGRAINOSUS, NOT INTRACTABLE: ICD-10-CM

## 2022-11-21 DIAGNOSIS — G45.1 TIA INVOLVING CAROTID ARTERY: ICD-10-CM

## 2022-11-21 DIAGNOSIS — R41.9 COGNITIVE COMPLAINTS: ICD-10-CM

## 2022-11-21 PROCEDURE — 99215 OFFICE O/P EST HI 40 MIN: CPT | Performed by: PSYCHIATRY & NEUROLOGY

## 2022-11-21 RX ORDER — TOPIRAMATE 50 MG/1
50 TABLET, FILM COATED ORAL DAILY
Qty: 90 TABLET | Refills: 1 | Status: SHIPPED | OUTPATIENT
Start: 2022-11-21

## 2022-11-21 RX ORDER — RIZATRIPTAN BENZOATE 10 MG/1
TABLET ORAL
Qty: 9 TABLET | Refills: 5 | Status: SHIPPED | OUTPATIENT
Start: 2022-11-21

## 2022-11-21 NOTE — PATIENT INSTRUCTIONS
As per discussion    I think what is going on with you was probably more migraine related than TIA but we do always NMO on the side of caution and we will treat this as a TIA so continue on the aspirin and Plavix presently when the prescription for the Plavix is run out do not get it refilled just continue to stay on an aspirin a day    In addition I want you to follow-up with primary care you need to be evaluated just in general and I need you to get a cholesterol profile completed as well      I want you back on your Topamax 50 mg every day with you have a headache or not  And you have the rizatriptan that you can use as needed    I have ordered some blood work for you that you need to get completed 2 to 3 months after you have completed the Plavix so it does not interfere with the blood work    Regarding work and accommodations, if you are not going to follow recommendations set forth by office regarding her health management I am not can continue to fill out paperwork for you related to work and accommodations    You need to make your health a priority at that means staying on medication to stay in your best state of health then that is what you do      In the meantime I hope you have a durga holiday season and a happy new year      Office Policies      Appointments  Please make sure that you arrive for your next appointment at least 15 minutes prior to your appointment time. If for some reason you are going to be late please notify the office to determine if you need to be rescheduled or we can adjust your appointment time      Phone calls/patient messages:  Please allow up to 24 hours for someone in the office to contact you about your call or message. Be mindful your provider may be out of the office or your message may require further review.  We encourage you to use [employer] for your messages as this is a faster, more efficient way to communicate with our office    Medication Refills:  Prescription medications require up to 48 business hours to process. We encourage you to use [employer] for your refills. For controlled medications: Please allow up to 72 business hours to process. Certain medications may require you to  a written prescription at our office. NO narcotic/controlled medications will be prescribed after 4pm Monday through Friday or on weekends    Form/Paperwork Completion:  We ask that you allow 7-14 business days. You may also download your forms to [employer] to have your doctor print off.
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
## 2022-11-21 NOTE — ASSESSMENT & PLAN NOTE
Neuro work-up unremarkable today continue on baby aspirin daily    Recent suspected TIA based on symptoms but patient continues to feel poorly and is having a headache and I am wondering whether this is more complicated migraine nonetheless of course we will err on the side of caution and treat this as a TIA    She is to continue on baby aspirin a day and Plavix to a 21-day course  She is to follow-up with primary care regarding lipid assessment to see if she needs to go back on Lipitor goal is to have her LDL less than 70    Would like to do hyper coag profile that has been ordered but but patient has been counseled to wait 3 months after she is completed the Plavix before getting completed

## 2022-11-21 NOTE — ASSESSMENT & PLAN NOTE
Patient is to be reestablished back on her Topamax 50 mg/day a new prescription was sent to the pharmacy  She is to continue to use rizatriptan as needed for migraine    I had a long discussion with the patient today about the need to remain adherent to treatment protocols  I have explicitly stated that I would not continue to fill out paperwork and or accommodations patient does not continue to be responsible for her own health and follow treatment recommendations

## 2022-11-21 NOTE — ASSESSMENT & PLAN NOTE
Most recent event approximately week ago through [address] on baby aspirin a day along with Plavix [Plavix is only for 21 days]  Follow-up with PCP regarding cholesterol evaluation and need to remain on Lipitor    It is possible this is complicated migraine versus TIA but will err on the side of being conservative and treat this as a TIA

## 2022-11-21 NOTE — PROGRESS NOTES
Chief Complaint   Patient presents with    Follow-up     TIA. Patient reports last TIA in 10/22    1. Have you been to the ER, urgent care clinic since your last visit? Hospitalized since your last visit? Yes October 2022 for TIA    2. Have you seen or consulted any other health care providers outside of the [address] [place] [address] since your last visit? Include any pap smears or colon screening.

## 2023-02-03 ENCOUNTER — TELEPHONE (OUTPATIENT)
Dept: NEUROLOGY | Age: 48
End: 2023-02-03

## 2023-02-03 NOTE — TELEPHONE ENCOUNTER
Called to ensure patient was on her way for the appointment, since she was already 10 minutes past the appt time. Patient stated she thought we were with her new neurologist and part of [facility]. She actually had gone there for the appt. She asked me to cancel the appt. She also stated she would like to cancel the appt in March with Alicia Perez. Today's appt is marked as a no-show and March's appt will be cancelled.

## 2023-05-20 RX ORDER — ATORVASTATIN CALCIUM 20 MG/1
TABLET, FILM COATED ORAL
COMMUNITY
Start: 2022-08-16

## 2023-05-20 RX ORDER — RIZATRIPTAN BENZOATE 10 MG/1
TABLET ORAL
COMMUNITY
Start: 2022-11-21

## 2023-05-20 RX ORDER — ASPIRIN 81 MG/1
TABLET ORAL
COMMUNITY

## 2023-05-20 RX ORDER — TOPIRAMATE 50 MG/1
50 TABLET, FILM COATED ORAL DAILY
COMMUNITY
Start: 2022-11-21